# Patient Record
Sex: FEMALE | Race: OTHER
[De-identification: names, ages, dates, MRNs, and addresses within clinical notes are randomized per-mention and may not be internally consistent; named-entity substitution may affect disease eponyms.]

---

## 2020-01-07 ENCOUNTER — HOSPITAL ENCOUNTER (OUTPATIENT)
Dept: HOSPITAL 53 - M LAB REF | Age: 25
End: 2020-01-07
Attending: OBSTETRICS & GYNECOLOGY
Payer: COMMERCIAL

## 2020-01-07 DIAGNOSIS — O36.80X0: Primary | ICD-10-CM

## 2020-01-07 LAB
B-HCG SERPL-ACNC: 3795 MIU/ML
HCT VFR BLD AUTO: 45.7 % (ref 36–47)
HGB BLD-MCNC: 15.1 G/DL (ref 12–15.5)
MCH RBC QN AUTO: 29.7 PG (ref 27–33)
MCHC RBC AUTO-ENTMCNC: 33 G/DL (ref 32–36.5)
MCV RBC AUTO: 90 FL (ref 80–96)
PLATELET # BLD AUTO: 293 10^3/UL (ref 150–450)
RBC # BLD AUTO: 5.08 10^6/UL (ref 4–5.4)
WBC # BLD AUTO: 9.3 10^3/UL (ref 4–10)

## 2020-01-08 LAB
HCV AB SER QL: < 0 INDEX (ref ?–0.8)
HIV 1+2 AB+HIV1 P24 AG SERPL QL IA: NEGATIVE
RUBELLA IGG QUALITATIVE: (no result)

## 2020-02-20 ENCOUNTER — HOSPITAL ENCOUNTER (OUTPATIENT)
Dept: HOSPITAL 53 - M WUC | Age: 25
End: 2020-02-20
Attending: ADVANCED PRACTICE MIDWIFE
Payer: COMMERCIAL

## 2020-02-20 DIAGNOSIS — Z3A.00: ICD-10-CM

## 2020-02-20 DIAGNOSIS — O99.211: Primary | ICD-10-CM

## 2020-02-20 LAB — EST. AVERAGE GLUCOSE BLD GHB EST-MCNC: 105 MG/DL (ref 60–110)

## 2020-03-04 ENCOUNTER — HOSPITAL ENCOUNTER (OUTPATIENT)
Dept: HOSPITAL 53 - M LAB | Age: 25
End: 2020-03-04
Attending: ADVANCED PRACTICE MIDWIFE
Payer: COMMERCIAL

## 2020-03-04 DIAGNOSIS — O34.211: Primary | ICD-10-CM

## 2020-04-09 ENCOUNTER — HOSPITAL ENCOUNTER (OUTPATIENT)
Dept: HOSPITAL 53 - M WHC | Age: 25
End: 2020-04-09
Attending: ADVANCED PRACTICE MIDWIFE
Payer: COMMERCIAL

## 2020-04-09 DIAGNOSIS — O34.211: Primary | ICD-10-CM

## 2020-04-10 ENCOUNTER — HOSPITAL ENCOUNTER (OUTPATIENT)
Dept: HOSPITAL 53 - M WHC | Age: 25
End: 2020-04-10
Attending: ADVANCED PRACTICE MIDWIFE
Payer: COMMERCIAL

## 2020-04-10 DIAGNOSIS — Z3A.19: ICD-10-CM

## 2020-04-10 DIAGNOSIS — O34.211: Primary | ICD-10-CM

## 2020-04-10 NOTE — REP
OB ULTRASOUND:

 

Real-time sonographic evaluation of the gravid uterus is performed.  There is a

single living intrauterine gestation with an estimated gestational age 19 weeks 3

days based on prior ultrasound, [facility] 09/01/2010.  Today's measurements indicate

appropriate growth.

 

BPD                      43 mm = 18 weeks 6 days, 36th percentile

 

HC                     174 mm = 19 weeks 6 days, 65th percentile

 

AC                     154 mm = 20 weeks 4 days, 75th percentile

 

Femur length             29 mm = 19 weeks 0 days, 38th percentile

 

HC/AC ratio 1.13 within normal range of 1.06-1.25.  Estimated fetal weight 316

grams, 61st percentile.  Cervix is closed and measures 4.4 cm in length.  Fetal

heart rate 147 beats per minute.

 

   SEEN/GROSSLY UNREMARKABLE

 

Lateral ventricles                       Yes

 

Posterior fossa                          Yes

 

Upper lip                                    Yes

 

Four-chamber heart                   Yes

 

LVOT                                         Yes

 

RVOT                                        Yes

 

Stomach                                    Yes

 

Cord insertion                            Yes

 

Three vessel cord                      Yes

 

Kidneys                                      Yes

 

Bladder                                      Yes

 

Spine                                         Yes

 

Fetal position:  Breech.

 

Placenta:  Anterior and grade 1.  Several cystic areas are seen in the placenta

measuring up to slightly greater than 2 cm in diameter.  Placenta appears

somewhat low lying, the inferior edge of the placenta is approximated to be 2 cm

from the internal cervical os.  Patient declined endovaginal ultrasound.

 

Amniotic fluid:    Within normal limits.

 

Cervix is closed and measures 4.4 cm in length.

## 2020-05-26 ENCOUNTER — HOSPITAL ENCOUNTER (OUTPATIENT)
Dept: HOSPITAL 53 - M PLALAB | Age: 25
End: 2020-05-26
Attending: ADVANCED PRACTICE MIDWIFE
Payer: COMMERCIAL

## 2020-05-26 DIAGNOSIS — O24.312: Primary | ICD-10-CM

## 2020-05-26 LAB
HCT VFR BLD AUTO: 37.5 % (ref 36–47)
HGB BLD-MCNC: 12.8 G/DL (ref 12–15.5)
MCH RBC QN AUTO: 30.6 PG (ref 27–33)
MCHC RBC AUTO-ENTMCNC: 34.1 G/DL (ref 32–36.5)
MCV RBC AUTO: 89.7 FL (ref 80–96)
PLATELET # BLD AUTO: 280 10^3/UL (ref 150–450)
RBC # BLD AUTO: 4.18 10^6/UL (ref 4–5.4)
WBC # BLD AUTO: 9.8 10^3/UL (ref 4–10)

## 2020-05-26 PROCEDURE — 86850 RBC ANTIBODY SCREEN: CPT

## 2020-05-26 PROCEDURE — 36415 COLL VENOUS BLD VENIPUNCTURE: CPT

## 2020-05-26 PROCEDURE — 85027 COMPLETE CBC AUTOMATED: CPT

## 2020-05-26 PROCEDURE — 86900 BLOOD TYPING SEROLOGIC ABO: CPT

## 2020-05-26 PROCEDURE — 86901 BLOOD TYPING SEROLOGIC RH(D): CPT

## 2020-06-12 ENCOUNTER — HOSPITAL ENCOUNTER (OUTPATIENT)
Dept: HOSPITAL 53 - M WHC | Age: 25
End: 2020-06-12
Attending: ADVANCED PRACTICE MIDWIFE
Payer: COMMERCIAL

## 2020-06-12 DIAGNOSIS — O24.113: Primary | ICD-10-CM

## 2020-06-13 NOTE — REP
REASON FOR EXAM:  Assess growth.

 

Multiple ultrasonographic images of the gravid uterus show a single living

intrauterine gestation in the cephalic presentation.  Doppler interrogation of

the fetal heart shows a heart rate of 149 beats per minute.  The placenta is

anterior and not low lying.  The subjective amniotic fluid volume is within

normal limits.  The calculated amniotic fluid index is 17.4 with an expected

range 9.3 to 22.9.  The cervix measures 3.1 cm in length and is closed.

 

CHART:

 

BPD                   7.3 cm = 29 weeks 3 days

 

HC                   26.4 cm = 28 weeks 5 days

 

AC                   24.8 cm = 29 weeks 0 days

 

FL                      5.5 cm = 29 weeks 0 days

 

The estimated fetal weight is 1316 grams, which is at the 56th percentile for a

28-week 3-day gestational age.

 

IMPRESSION:

 

Single living intrauterine gestation, as described above, with an estimated

gestational age of 28 weeks 4 days via composite criteria and an estimated date

of delivery of 08/31/2020 based by today's exam.

## 2020-07-02 ENCOUNTER — HOSPITAL ENCOUNTER (OUTPATIENT)
Dept: HOSPITAL 53 - M WHC | Age: 25
End: 2020-07-02
Attending: ADVANCED PRACTICE MIDWIFE
Payer: COMMERCIAL

## 2020-07-02 DIAGNOSIS — O24.419: Primary | ICD-10-CM

## 2020-07-02 NOTE — REP
Clinical: Growth evaluation

 

Comparison: 06/12/2020 .

 

Findings:

Examination demonstrates a single live intrauterine pregnancy in cephalic

presentation.  Fetal motion is identified by technologist.  Placenta is noted

anterior and grade I I  without evidence for placenta previa or abruption.

Amniotic fluid volume is normal.  Cervix measures 3.3 cm in length and appears

closed.  No evidence for nuchal cord.

 

Gestational age by LMP 31 weeks 2 days with ZAIN 09/01/2020 .

Gestational age by current measurements 31 weeks 1 day with [facility] 09/02/2020 .

 

FHR equals 150  beats per minute.

Estimated fetal weight 1741 grams ( 43rd percentile).

Amniotic fluid index:  16.3 cm (8.7 - 23.9)

 

Impression:

single live intrauterine pregnancy in cephalic presentation demonstrating

appropriate interval growth.  No gross abnormalities are identified.

## 2020-08-05 ENCOUNTER — HOSPITAL ENCOUNTER (OUTPATIENT)
Dept: HOSPITAL 53 - M SFHCWAGY | Age: 25
End: 2020-08-05
Attending: ADVANCED PRACTICE MIDWIFE
Payer: COMMERCIAL

## 2020-08-05 DIAGNOSIS — Z34.83: Primary | ICD-10-CM

## 2020-08-14 ENCOUNTER — HOSPITAL ENCOUNTER (OUTPATIENT)
Dept: HOSPITAL 53 - M WHC | Age: 25
End: 2020-08-14
Attending: ADVANCED PRACTICE MIDWIFE
Payer: COMMERCIAL

## 2020-08-14 DIAGNOSIS — O24.419: Primary | ICD-10-CM

## 2020-08-14 DIAGNOSIS — Z3A.37: ICD-10-CM

## 2020-08-20 ENCOUNTER — HOSPITAL ENCOUNTER (INPATIENT)
Dept: HOSPITAL 53 - M LDI | Age: 25
LOS: 2 days | Discharge: HOME | DRG: 540 | End: 2020-08-22
Attending: OBSTETRICS & GYNECOLOGY | Admitting: OBSTETRICS & GYNECOLOGY
Payer: COMMERCIAL

## 2020-08-20 VITALS — SYSTOLIC BLOOD PRESSURE: 117 MMHG | DIASTOLIC BLOOD PRESSURE: 69 MMHG

## 2020-08-20 VITALS — SYSTOLIC BLOOD PRESSURE: 123 MMHG | DIASTOLIC BLOOD PRESSURE: 75 MMHG

## 2020-08-20 VITALS — SYSTOLIC BLOOD PRESSURE: 111 MMHG | DIASTOLIC BLOOD PRESSURE: 56 MMHG

## 2020-08-20 VITALS — SYSTOLIC BLOOD PRESSURE: 126 MMHG | DIASTOLIC BLOOD PRESSURE: 79 MMHG

## 2020-08-20 VITALS — DIASTOLIC BLOOD PRESSURE: 82 MMHG | SYSTOLIC BLOOD PRESSURE: 131 MMHG

## 2020-08-20 VITALS — WEIGHT: 197.53 LBS | BODY MASS INDEX: 29.94 KG/M2 | HEIGHT: 68 IN

## 2020-08-20 VITALS — DIASTOLIC BLOOD PRESSURE: 75 MMHG | SYSTOLIC BLOOD PRESSURE: 123 MMHG

## 2020-08-20 DIAGNOSIS — Z3A.38: ICD-10-CM

## 2020-08-20 DIAGNOSIS — Z79.84: ICD-10-CM

## 2020-08-20 DIAGNOSIS — O34.211: Primary | ICD-10-CM

## 2020-08-20 LAB
HCT VFR BLD AUTO: 35.3 % (ref 36–47)
HGB BLD-MCNC: 12.2 G/DL (ref 12–15.5)
MCH RBC QN AUTO: 30.8 PG (ref 27–33)
MCHC RBC AUTO-ENTMCNC: 34.6 G/DL (ref 32–36.5)
MCV RBC AUTO: 89.1 FL (ref 80–96)
PLATELET # BLD AUTO: 206 10^3/UL (ref 150–450)
RBC # BLD AUTO: 3.96 10^6/UL (ref 4–5.4)
WBC # BLD AUTO: 8.3 10^3/UL (ref 4–10)

## 2020-08-20 RX ADMIN — KETOROLAC TROMETHAMINE SCH MG: 30 INJECTION, SOLUTION INTRAMUSCULAR at 15:02

## 2020-08-20 RX ADMIN — Medication SCH TAB: at 09:00

## 2020-08-20 RX ADMIN — DOCUSATE SODIUM SCH MG: 100 CAPSULE, LIQUID FILLED ORAL at 09:00

## 2020-08-20 RX ADMIN — FENTANYL CITRATE PRN MCG: 50 INJECTION, SOLUTION INTRAMUSCULAR; INTRAVENOUS at 10:07

## 2020-08-20 RX ADMIN — FENTANYL CITRATE PRN MCG: 50 INJECTION, SOLUTION INTRAMUSCULAR; INTRAVENOUS at 10:30

## 2020-08-20 RX ADMIN — KETOROLAC TROMETHAMINE SCH MG: 30 INJECTION, SOLUTION INTRAMUSCULAR at 20:54

## 2020-08-20 RX ADMIN — DOCUSATE SODIUM SCH MG: 100 CAPSULE, LIQUID FILLED ORAL at 20:54

## 2020-08-20 RX ADMIN — FENTANYL CITRATE PRN MCG: 50 INJECTION, SOLUTION INTRAMUSCULAR; INTRAVENOUS at 10:12

## 2020-08-21 VITALS — SYSTOLIC BLOOD PRESSURE: 125 MMHG | DIASTOLIC BLOOD PRESSURE: 65 MMHG

## 2020-08-21 VITALS — DIASTOLIC BLOOD PRESSURE: 68 MMHG | SYSTOLIC BLOOD PRESSURE: 110 MMHG

## 2020-08-21 VITALS — SYSTOLIC BLOOD PRESSURE: 105 MMHG | DIASTOLIC BLOOD PRESSURE: 59 MMHG

## 2020-08-21 VITALS — SYSTOLIC BLOOD PRESSURE: 111 MMHG | DIASTOLIC BLOOD PRESSURE: 55 MMHG

## 2020-08-21 VITALS — DIASTOLIC BLOOD PRESSURE: 58 MMHG | SYSTOLIC BLOOD PRESSURE: 115 MMHG

## 2020-08-21 LAB
HCT VFR BLD AUTO: 23.5 % (ref 36–47)
HGB BLD-MCNC: 8.3 G/DL (ref 12–15.5)
MCH RBC QN AUTO: 31.9 PG (ref 27–33)
MCHC RBC AUTO-ENTMCNC: 35.3 G/DL (ref 32–36.5)
MCV RBC AUTO: 90.4 FL (ref 80–96)
PLATELET # BLD AUTO: 183 10^3/UL (ref 150–450)
RBC # BLD AUTO: 2.6 10^6/UL (ref 4–5.4)
WBC # BLD AUTO: 8.2 10^3/UL (ref 4–10)

## 2020-08-21 RX ADMIN — DOCUSATE SODIUM SCH MG: 100 CAPSULE, LIQUID FILLED ORAL at 19:35

## 2020-08-21 RX ADMIN — IBUPROFEN SCH MG: 800 TABLET, FILM COATED ORAL at 11:10

## 2020-08-21 RX ADMIN — Medication SCH TAB: at 08:22

## 2020-08-21 RX ADMIN — DOCUSATE SODIUM SCH MG: 100 CAPSULE, LIQUID FILLED ORAL at 08:22

## 2020-08-21 RX ADMIN — KETOROLAC TROMETHAMINE SCH MG: 30 INJECTION, SOLUTION INTRAMUSCULAR at 02:33

## 2020-08-21 RX ADMIN — IBUPROFEN SCH MG: 800 TABLET, FILM COATED ORAL at 19:35

## 2020-08-21 NOTE — IPNPDOC
Postpartum Progress Note


Date of Service:  Aug 21, 2020


Postpartum Day#:  1


Postpartum Progress Note


SUBJECT: Patient is a 25-year-old female who had a repeat  section 

yesterday. She has been ambulating, voiding spontaneously without issue and 

tolerating regular diet. 





OBJECTIVE: 


VITAL SIGNS: Within normal limits, afebrile.


Alert and oriented times three.


Breath sounds clear to auscultation.


Heart rate: Regular rate and rhythm, no murmurs, rubs or gallops.


Abdomen: Fundus firm at U. 


[Minimal] lochia.





ASSESSMENT: Day 1 postoperative





PLAN:


1. Continue supportive nursing care and pain management. 


2. Patient may shower. 


3. Discharge to home tomorrow.





VS, I&O, 24H, Fishbone


Vital Signs/I&O





Vital Signs








  Date Time  Temp Pulse Resp B/P (MAP) Pulse Ox O2 Delivery O2 Flow Rate FiO2


 


20 02:00 98.6 70 16 105/59 (74) 97 Room Air  














I&O- Last 24 Hours up to 6 AM 


 


 20





 06:00


 


Intake Total 2475 ml


 


Output Total 2518 ml


 


Balance -43 ml











Laboratory Data


24H LABS


Laboratory Tests 2


20 06:00: Nucleated Red Blood Cells % (auto) 0.0


CBC/BMP


Laboratory Tests


20 06:00

















JERARDO GURROLA CNM            Aug 21, 2020 09:37

## 2020-08-22 VITALS — SYSTOLIC BLOOD PRESSURE: 109 MMHG | DIASTOLIC BLOOD PRESSURE: 68 MMHG

## 2020-08-22 VITALS — DIASTOLIC BLOOD PRESSURE: 53 MMHG | SYSTOLIC BLOOD PRESSURE: 97 MMHG

## 2020-08-22 VITALS — SYSTOLIC BLOOD PRESSURE: 129 MMHG | DIASTOLIC BLOOD PRESSURE: 74 MMHG

## 2020-08-22 RX ADMIN — Medication SCH TAB: at 09:08

## 2020-08-22 RX ADMIN — IBUPROFEN SCH MG: 800 TABLET, FILM COATED ORAL at 10:47

## 2020-08-22 RX ADMIN — DOCUSATE SODIUM SCH MG: 100 CAPSULE, LIQUID FILLED ORAL at 09:08

## 2020-08-22 RX ADMIN — IBUPROFEN SCH MG: 800 TABLET, FILM COATED ORAL at 02:11

## 2020-09-21 NOTE — REP
OBSTETRIC SONOGRAPHY 



HISTORY: Gestational diabetes. Growth study. 



This report was delayed due to a protracted episode of network disruption 
experienced by this facility. 



FINDINGS: 

Scanning through the gravid uterus demonstrates a viable single intrauterine 
gestation in a cephalic fetal lie. The placenta is anterior grade 3 without 
evidence of previa. Placenta lakes are visible in the placenta. Amniotic fluid 
is subjectively normal. YEFRI is normal at 17.9 cm. Closed cervical is measured at
3.8 cm view transabdominally. Fetal heart rate is recorded at 144 beats per 
minute. Fetal left-sided stomach, kidneys and bladder, face and lips, and three-
vessel cord are seen today and are unremarkable. 



BIOMETRY CHART: 







BPD  91 mm 37 weeks 0 days

 

Head Circumference  343 mm 39 weeks 4 days

 

Abdominal Circumference 331 mm  37 weeks 0 days

 

Femur Length  72 mm  36 weeks 5 days

 

Humeral Length  64 mm  37 weeks 0 days

 

Estimated Fetal Weight  3140 g 52nd percentile





IMPRESSION: 

Viable single intrauterine gestation 37 weeks 4 days by today's composite 
criteria. Estimated date of delivery ([facility]) by today's sonography 08/31/2020.

MTDD

## 2020-12-01 ENCOUNTER — HOSPITAL ENCOUNTER (OUTPATIENT)
Dept: HOSPITAL 53 - M SFHCWAGY | Age: 25
End: 2020-12-01
Attending: OBSTETRICS & GYNECOLOGY
Payer: COMMERCIAL

## 2020-12-01 DIAGNOSIS — Z12.4: Primary | ICD-10-CM

## 2022-03-21 ENCOUNTER — HOSPITAL ENCOUNTER (OUTPATIENT)
Dept: HOSPITAL 53 - M WHC | Age: 27
End: 2022-03-21
Attending: OBSTETRICS & GYNECOLOGY
Payer: COMMERCIAL

## 2022-03-21 DIAGNOSIS — Z36.2: Primary | ICD-10-CM

## 2022-03-21 DIAGNOSIS — Z3A.21: ICD-10-CM

## 2022-05-06 ENCOUNTER — HOSPITAL ENCOUNTER (OUTPATIENT)
Dept: HOSPITAL 53 - M PLALAB | Age: 27
End: 2022-05-06
Attending: ADVANCED PRACTICE MIDWIFE
Payer: COMMERCIAL

## 2022-05-06 DIAGNOSIS — O24.419: Primary | ICD-10-CM

## 2022-05-06 LAB
HCT VFR BLD AUTO: 37.3 % (ref 36–47)
HGB BLD-MCNC: 12.7 G/DL (ref 12–15.5)
MCH RBC QN AUTO: 30.7 PG (ref 27–33)
MCHC RBC AUTO-ENTMCNC: 34 G/DL (ref 32–36.5)
MCV RBC AUTO: 90.1 FL (ref 80–96)
PLATELET # BLD AUTO: 249 10^3/UL (ref 150–450)
RBC # BLD AUTO: 4.14 10^6/UL (ref 4–5.4)
WBC # BLD AUTO: 8.5 10^3/UL (ref 4–10)

## 2022-06-10 ENCOUNTER — HOSPITAL ENCOUNTER (OUTPATIENT)
Dept: HOSPITAL 53 - M WHC | Age: 27
End: 2022-06-10
Attending: ADVANCED PRACTICE MIDWIFE
Payer: COMMERCIAL

## 2022-06-10 DIAGNOSIS — O24.313: Primary | ICD-10-CM

## 2022-06-10 DIAGNOSIS — Z3A.34: ICD-10-CM

## 2022-06-29 ENCOUNTER — HOSPITAL ENCOUNTER (OUTPATIENT)
Dept: HOSPITAL 53 - M SFHCWAGY | Age: 27
End: 2022-06-29
Attending: OBSTETRICS & GYNECOLOGY
Payer: COMMERCIAL

## 2022-06-29 DIAGNOSIS — O34.211: Primary | ICD-10-CM

## 2022-07-01 ENCOUNTER — HOSPITAL ENCOUNTER (OUTPATIENT)
Dept: HOSPITAL 53 - M WHC | Age: 27
End: 2022-07-01
Attending: ADVANCED PRACTICE MIDWIFE
Payer: COMMERCIAL

## 2022-07-01 DIAGNOSIS — Z3A.36: ICD-10-CM

## 2022-07-01 DIAGNOSIS — O24.319: ICD-10-CM

## 2022-07-01 DIAGNOSIS — Z36.2: Primary | ICD-10-CM

## 2022-07-11 ENCOUNTER — HOSPITAL ENCOUNTER (OUTPATIENT)
Dept: HOSPITAL 53 - M LABSMTC | Age: 27
End: 2022-07-11
Attending: ANESTHESIOLOGY
Payer: COMMERCIAL

## 2022-07-11 DIAGNOSIS — Z01.818: Primary | ICD-10-CM

## 2022-07-11 DIAGNOSIS — Z11.52: ICD-10-CM

## 2022-07-15 ENCOUNTER — HOSPITAL ENCOUNTER (INPATIENT)
Dept: HOSPITAL 53 - M LDI | Age: 27
LOS: 2 days | Discharge: HOME | DRG: 540 | End: 2022-07-17
Attending: OBSTETRICS & GYNECOLOGY | Admitting: OBSTETRICS & GYNECOLOGY
Payer: COMMERCIAL

## 2022-07-15 VITALS — BODY MASS INDEX: 30.41 KG/M2 | WEIGHT: 200.62 LBS | HEIGHT: 68 IN

## 2022-07-15 VITALS — SYSTOLIC BLOOD PRESSURE: 141 MMHG | DIASTOLIC BLOOD PRESSURE: 77 MMHG

## 2022-07-15 VITALS — DIASTOLIC BLOOD PRESSURE: 72 MMHG | SYSTOLIC BLOOD PRESSURE: 126 MMHG

## 2022-07-15 VITALS — SYSTOLIC BLOOD PRESSURE: 148 MMHG | DIASTOLIC BLOOD PRESSURE: 78 MMHG

## 2022-07-15 VITALS — SYSTOLIC BLOOD PRESSURE: 138 MMHG | DIASTOLIC BLOOD PRESSURE: 72 MMHG

## 2022-07-15 VITALS — DIASTOLIC BLOOD PRESSURE: 78 MMHG | SYSTOLIC BLOOD PRESSURE: 135 MMHG

## 2022-07-15 VITALS — SYSTOLIC BLOOD PRESSURE: 122 MMHG | DIASTOLIC BLOOD PRESSURE: 76 MMHG

## 2022-07-15 VITALS — SYSTOLIC BLOOD PRESSURE: 110 MMHG | DIASTOLIC BLOOD PRESSURE: 65 MMHG

## 2022-07-15 VITALS — SYSTOLIC BLOOD PRESSURE: 137 MMHG | DIASTOLIC BLOOD PRESSURE: 77 MMHG

## 2022-07-15 DIAGNOSIS — Z3A.38: ICD-10-CM

## 2022-07-15 DIAGNOSIS — O34.211: Primary | ICD-10-CM

## 2022-07-15 LAB
HCT VFR BLD AUTO: 32.3 % (ref 36–47)
HGB BLD-MCNC: 11.2 G/DL (ref 12–15.5)
MCH RBC QN AUTO: 30.3 PG (ref 27–33)
MCHC RBC AUTO-ENTMCNC: 34.7 G/DL (ref 32–36.5)
MCV RBC AUTO: 87.3 FL (ref 80–96)
PLATELET # BLD AUTO: 216 10^3/UL (ref 150–450)
RBC # BLD AUTO: 3.7 10^6/UL (ref 4–5.4)
WBC # BLD AUTO: 7.9 10^3/UL (ref 4–10)

## 2022-07-15 RX ADMIN — SODIUM CHLORIDE, PRESERVATIVE FREE SCH ML: 5 INJECTION INTRAVENOUS at 10:00

## 2022-07-15 RX ADMIN — KETOROLAC TROMETHAMINE SCH MG: 30 INJECTION, SOLUTION INTRAMUSCULAR at 14:53

## 2022-07-15 RX ADMIN — DOCUSATE SODIUM SCH MG: 100 CAPSULE, LIQUID FILLED ORAL at 09:00

## 2022-07-15 RX ADMIN — Medication SCH TAB: at 09:00

## 2022-07-15 RX ADMIN — KETOROLAC TROMETHAMINE SCH MG: 30 INJECTION, SOLUTION INTRAMUSCULAR at 21:06

## 2022-07-15 RX ADMIN — SODIUM CHLORIDE, POTASSIUM CHLORIDE, SODIUM LACTATE AND CALCIUM CHLORIDE SCH MLS/HR: 600; 310; 30; 20 INJECTION, SOLUTION INTRAVENOUS at 14:52

## 2022-07-15 RX ADMIN — SODIUM CHLORIDE, POTASSIUM CHLORIDE, SODIUM LACTATE AND CALCIUM CHLORIDE SCH MLS/HR: 600; 310; 30; 20 INJECTION, SOLUTION INTRAVENOUS at 06:01

## 2022-07-15 RX ADMIN — DOCUSATE SODIUM SCH MG: 100 CAPSULE, LIQUID FILLED ORAL at 21:06

## 2022-07-15 RX ADMIN — SODIUM CHLORIDE, PRESERVATIVE FREE SCH ML: 5 INJECTION INTRAVENOUS at 14:53

## 2022-07-16 VITALS — SYSTOLIC BLOOD PRESSURE: 121 MMHG | DIASTOLIC BLOOD PRESSURE: 65 MMHG

## 2022-07-16 VITALS — SYSTOLIC BLOOD PRESSURE: 112 MMHG | DIASTOLIC BLOOD PRESSURE: 58 MMHG

## 2022-07-16 VITALS — DIASTOLIC BLOOD PRESSURE: 54 MMHG | SYSTOLIC BLOOD PRESSURE: 104 MMHG

## 2022-07-16 VITALS — DIASTOLIC BLOOD PRESSURE: 79 MMHG | SYSTOLIC BLOOD PRESSURE: 137 MMHG

## 2022-07-16 VITALS — DIASTOLIC BLOOD PRESSURE: 72 MMHG | SYSTOLIC BLOOD PRESSURE: 127 MMHG

## 2022-07-16 VITALS — SYSTOLIC BLOOD PRESSURE: 114 MMHG | DIASTOLIC BLOOD PRESSURE: 69 MMHG

## 2022-07-16 LAB
HCT VFR BLD AUTO: 26.4 % (ref 36–47)
HGB BLD-MCNC: 8.7 G/DL (ref 12–15.5)
MCH RBC QN AUTO: 29.3 PG (ref 27–33)
MCHC RBC AUTO-ENTMCNC: 33 G/DL (ref 32–36.5)
MCV RBC AUTO: 88.9 FL (ref 80–96)
PLATELET # BLD AUTO: 201 10^3/UL (ref 150–450)
RBC # BLD AUTO: 2.97 10^6/UL (ref 4–5.4)
WBC # BLD AUTO: 8.8 10^3/UL (ref 4–10)

## 2022-07-16 RX ADMIN — KETOROLAC TROMETHAMINE SCH MG: 30 INJECTION, SOLUTION INTRAMUSCULAR at 03:14

## 2022-07-16 RX ADMIN — DOCUSATE SODIUM SCH MG: 100 CAPSULE, LIQUID FILLED ORAL at 08:33

## 2022-07-16 RX ADMIN — IBUPROFEN SCH MG: 800 TABLET, FILM COATED ORAL at 18:11

## 2022-07-16 RX ADMIN — IBUPROFEN SCH MG: 800 TABLET, FILM COATED ORAL at 10:28

## 2022-07-16 RX ADMIN — FERROUS SULFATE TAB 325 MG (65 MG ELEMENTAL FE) SCH MG: 325 (65 FE) TAB at 11:58

## 2022-07-16 RX ADMIN — Medication SCH TAB: at 08:33

## 2022-07-16 RX ADMIN — FERROUS SULFATE TAB 325 MG (65 MG ELEMENTAL FE) SCH MG: 325 (65 FE) TAB at 20:18

## 2022-07-16 RX ADMIN — DOCUSATE SODIUM SCH MG: 100 CAPSULE, LIQUID FILLED ORAL at 20:17

## 2022-07-16 RX ADMIN — SODIUM CHLORIDE, PRESERVATIVE FREE SCH ML: 5 INJECTION INTRAVENOUS at 03:14

## 2022-07-17 VITALS — SYSTOLIC BLOOD PRESSURE: 110 MMHG | DIASTOLIC BLOOD PRESSURE: 60 MMHG

## 2022-07-17 VITALS — SYSTOLIC BLOOD PRESSURE: 126 MMHG | DIASTOLIC BLOOD PRESSURE: 59 MMHG

## 2022-07-17 VITALS — DIASTOLIC BLOOD PRESSURE: 60 MMHG | SYSTOLIC BLOOD PRESSURE: 110 MMHG

## 2022-07-17 RX ADMIN — FERROUS SULFATE TAB 325 MG (65 MG ELEMENTAL FE) SCH MG: 325 (65 FE) TAB at 09:03

## 2022-07-17 RX ADMIN — IBUPROFEN SCH MG: 800 TABLET, FILM COATED ORAL at 10:40

## 2022-07-17 RX ADMIN — IBUPROFEN SCH MG: 800 TABLET, FILM COATED ORAL at 02:37

## 2022-07-17 RX ADMIN — Medication SCH TAB: at 09:03

## 2022-07-17 RX ADMIN — DOCUSATE SODIUM SCH MG: 100 CAPSULE, LIQUID FILLED ORAL at 09:03

## 2022-12-02 ENCOUNTER — HOSPITAL ENCOUNTER (OUTPATIENT)
Dept: HOSPITAL 53 - M LAB REF | Age: 27
End: 2022-12-02
Payer: COMMERCIAL

## 2022-12-02 DIAGNOSIS — R79.89: Primary | ICD-10-CM

## 2022-12-02 LAB
CHOLEST SERPL-MCNC: 177 MG/DL (ref ?–200)
CHOLEST/HDLC SERPL: 3.95 {RATIO} (ref ?–5)
HDLC SERPL-MCNC: 44.7 MG/DL (ref 40–?)
LDLC SERPL CALC-MCNC: 111.7 MG/DL (ref ?–100)
NONHDLC SERPL-MCNC: 132 MG/DL
TRIGL SERPL-MCNC: 103 MG/DL (ref ?–150)

## 2023-02-15 ENCOUNTER — HOSPITAL ENCOUNTER (OUTPATIENT)
Dept: HOSPITAL 53 - M SFHCWAGY | Age: 28
End: 2023-02-15
Attending: OBSTETRICS & GYNECOLOGY
Payer: COMMERCIAL

## 2023-02-15 DIAGNOSIS — Z12.4: Primary | ICD-10-CM
